# Patient Record
Sex: MALE | URBAN - METROPOLITAN AREA
[De-identification: names, ages, dates, MRNs, and addresses within clinical notes are randomized per-mention and may not be internally consistent; named-entity substitution may affect disease eponyms.]

---

## 2017-07-03 ENCOUNTER — APPOINTMENT (RX ONLY)
Dept: URBAN - METROPOLITAN AREA CLINIC 77 | Facility: CLINIC | Age: 32
Setting detail: DERMATOLOGY
End: 2017-07-03

## 2017-07-03 DIAGNOSIS — D18.0 HEMANGIOMA: ICD-10-CM

## 2017-07-03 DIAGNOSIS — L85.3 XEROSIS CUTIS: ICD-10-CM

## 2017-07-03 DIAGNOSIS — L72.0 EPIDERMAL CYST: ICD-10-CM

## 2017-07-03 PROBLEM — D18.01 HEMANGIOMA OF SKIN AND SUBCUTANEOUS TISSUE: Status: ACTIVE | Noted: 2017-07-03

## 2017-07-03 PROCEDURE — 99203 OFFICE O/P NEW LOW 30 MIN: CPT | Mod: 25

## 2017-07-03 PROCEDURE — 69100 BIOPSY OF EXTERNAL EAR: CPT

## 2017-07-03 PROCEDURE — ? COUNSELING

## 2017-07-03 PROCEDURE — ? TREATMENT REGIMEN

## 2017-07-03 PROCEDURE — ? BIOPSY BY PUNCH METHOD

## 2017-07-03 PROCEDURE — ? PRESCRIPTION

## 2017-07-03 RX ORDER — MINOCYCLINE HYDROCHLORIDE 115 MG/1
TABLET, FILM COATED, EXTENDED RELEASE ORAL
Qty: 30 | Refills: 2 | Status: ERX | COMMUNITY
Start: 2017-07-03

## 2017-07-03 RX ADMIN — MINOCYCLINE HYDROCHLORIDE: 115 TABLET, FILM COATED, EXTENDED RELEASE ORAL at 14:47

## 2017-07-03 ASSESSMENT — LOCATION DETAILED DESCRIPTION DERM
LOCATION DETAILED: LEFT ANTITRAGUS
LOCATION DETAILED: LEFT CLAVICULAR SKIN
LOCATION DETAILED: LEFT INFERIOR HELIX
LOCATION DETAILED: LEFT ANTERIOR EARLOBE
LOCATION DETAILED: RIGHT SUPERIOR UPPER BACK

## 2017-07-03 ASSESSMENT — LOCATION SIMPLE DESCRIPTION DERM
LOCATION SIMPLE: RIGHT UPPER BACK
LOCATION SIMPLE: LEFT EAR
LOCATION SIMPLE: LEFT CLAVICULAR SKIN

## 2017-07-03 ASSESSMENT — LOCATION ZONE DERM
LOCATION ZONE: EAR
LOCATION ZONE: TRUNK

## 2017-07-03 NOTE — PROCEDURE: BIOPSY BY PUNCH METHOD
Epidermal Sutures: 5-0 Ethilon
Dressing: bandage
X Size Of Lesion In Cm (Optional): 0
Billing Type: Third-Party Bill
Consent: Written consent was obtained and risks were reviewed including but not limited to scarring, infection, bleeding, scabbing, incomplete removal, nerve damage and allergy to anesthesia.
Lab: [id number]
Home Suture Removal Text: Patient was provided a home suture removal kit and will remove their sutures at home.  If they have any questions or difficulties they will call the office.
Body Location Override (Optional - Billing Will Still Be Based On Selected Body Map Location If Applicable): left ear
Anesthesia Volume In Cc (Will Not Render If 0): 0.5
Post-Care Instructions: I reviewed with the patient in detail post-care instructions. Patient is to keep the biopsy site dry overnight, and then apply bacitracin twice daily until healed. Patient may apply hydrogen peroxide soaks to remove any crusting.
Biopsy Type: H and E
Notification Instructions: Patient will be notified of biopsy results. However, patient instructed to call the office if not contacted within 2 weeks.
Punch Size In Mm: 3
Suture Removal: 7 days
Hemostasis: None
Render Post-Care Instructions In Note?: no
Anesthesia Type: 1% lidocaine with epinephrine
Lab Facility: [id number]
Detail Level: Detailed
Deep Sutures: 5-0 Vicryl
Wound Care: Bacitracin

## 2017-07-03 NOTE — PROCEDURE: TREATMENT REGIMEN
Plan: Location: left ear\\nDiscussed with him that it is an EIC, a benign growth that if he finds bothersome, can excise\\nDiscussed with him that he has numerous ear cysts and is most likely prone to developing them\\nToday we will excise the two that are inflamed and painful
Detail Level: Zone